# Patient Record
Sex: FEMALE | Race: WHITE | Employment: FULL TIME | URBAN - METROPOLITAN AREA
[De-identification: names, ages, dates, MRNs, and addresses within clinical notes are randomized per-mention and may not be internally consistent; named-entity substitution may affect disease eponyms.]

---

## 2018-06-28 PROBLEM — M75.102 ROTATOR CUFF SYNDROME OF LEFT SHOULDER: Status: ACTIVE | Noted: 2018-06-28

## 2018-06-28 PROBLEM — M18.12 PRIMARY OSTEOARTHRITIS OF FIRST CARPOMETACARPAL JOINT OF LEFT HAND: Status: ACTIVE | Noted: 2018-06-28

## 2018-06-28 PROBLEM — M19.049 CMC ARTHRITIS: Status: ACTIVE | Noted: 2018-06-28

## 2019-01-14 PROCEDURE — 88175 CYTOPATH C/V AUTO FLUID REDO: CPT | Performed by: OBSTETRICS & GYNECOLOGY

## 2020-12-15 ENCOUNTER — OFFICE VISIT (OUTPATIENT)
Dept: ORTHOPEDICS CLINIC | Facility: CLINIC | Age: 60
End: 2020-12-15
Payer: COMMERCIAL

## 2020-12-15 DIAGNOSIS — L60.0 ONYCHOCRYPTOSIS: Primary | ICD-10-CM

## 2020-12-15 PROCEDURE — 99202 OFFICE O/P NEW SF 15 MIN: CPT | Performed by: PODIATRIST

## 2020-12-15 NOTE — H&P
[facility] New Patient Note    CC: Patient presents with:  Ingrown Toenail: Patient is here today due to having a left foot ingrown nail. Patient states that it has been on and off for about 9 months.       HPI: This 63year old female presents tod Problem Relation Age of Onset   • Cancer Mother         stomach   • Hypertension Father    • Heart Disease Father    • Diabetes Brother      Social History    Occupational History      Not on file    Tobacco Use      Smoking status: Former Smoker

## 2020-12-29 ENCOUNTER — OFFICE VISIT (OUTPATIENT)
Dept: ORTHOPEDICS CLINIC | Facility: CLINIC | Age: 60
End: 2020-12-29
Payer: COMMERCIAL

## 2020-12-29 DIAGNOSIS — L60.0 ONYCHOCRYPTOSIS: Primary | ICD-10-CM

## 2020-12-29 PROCEDURE — 11750 EXCISION NAIL&NAIL MATRIX: CPT | Performed by: PODIATRIST

## 2020-12-29 NOTE — PROGRESS NOTES
[facility] Progress Note    Subjective: Patient is here to have the second and third toenails addressed, both corners both borders and third toes left foot    Objective: 2nddigit and third digit left foot, incurvated nail borders medial and latera

## 2021-01-12 ENCOUNTER — OFFICE VISIT (OUTPATIENT)
Dept: ORTHOPEDICS CLINIC | Facility: CLINIC | Age: 61
End: 2021-01-12
Payer: COMMERCIAL

## 2021-01-12 DIAGNOSIS — L60.0 ONYCHOCRYPTOSIS: Primary | ICD-10-CM

## 2021-01-12 PROCEDURE — 99213 OFFICE O/P EST LOW 20 MIN: CPT | Performed by: PODIATRIST

## 2021-01-12 RX ORDER — CEPHALEXIN 500 MG/1
500 CAPSULE ORAL 2 TIMES DAILY
Qty: 14 CAPSULE | Refills: 1 | Status: SHIPPED | OUTPATIENT
Start: 2021-01-12 | End: 2021-02-23

## 2021-01-12 NOTE — PROGRESS NOTES
[facility] Progress Note    Subjective: Wynette Felty is here for follow-up of her second and third toenails left foot. 2 weeks ago we did P&A procedures to both borders medial and lateral, both toes.   She has done well done a lot of soaking and just

## (undated) NOTE — LETTER
Dr. Elen Anthony D.P.M.   [facility] - ORTHOPEDICS  [address] [facility], [facility] [address]  [place] [address] [facility] [address] [phone]      Soco Ignacio  12/29/2020    Post Operative Nail/Wart Instructions    Soaking solution: Epsom Salt: 1/2 cup of

## (undated) NOTE — LETTER
Pre-Admission Testing Department  Phone: [phone]  Right Fax: [phone]    ADDRESSEE INFORMATION: SENDER INFORMATION:   To:   *** From: ***     Department: Pre-Admission Testing   Fax Number: *** Date: 12/29/2020     Phone Number: *** Phone Numb This message is intended only for the use of the individual or entity to which it is addressed. It may have been disclosed to you from records whose confidentiality is protected by [employer] and applicable state law. Federal Regulation, [address]  [address] part